# Patient Record
Sex: FEMALE | Race: WHITE | Employment: FULL TIME | URBAN - METROPOLITAN AREA
[De-identification: names, ages, dates, MRNs, and addresses within clinical notes are randomized per-mention and may not be internally consistent; named-entity substitution may affect disease eponyms.]

---

## 2019-10-10 ENCOUNTER — TELEPHONE (OUTPATIENT)
Dept: ADMINISTRATIVE | Age: 64
End: 2019-10-10

## 2021-07-12 ENCOUNTER — APPOINTMENT (OUTPATIENT)
Dept: GENERAL RADIOLOGY | Age: 66
End: 2021-07-12
Payer: COMMERCIAL

## 2021-07-12 ENCOUNTER — HOSPITAL ENCOUNTER (EMERGENCY)
Age: 66
Discharge: HOME | End: 2021-07-12
Attending: EMERGENCY MEDICINE
Payer: COMMERCIAL

## 2021-07-12 VITALS
OXYGEN SATURATION: 97 % | HEART RATE: 76 BPM | RESPIRATION RATE: 16 BRPM | WEIGHT: 148 LBS | HEIGHT: 63 IN | SYSTOLIC BLOOD PRESSURE: 127 MMHG | BODY MASS INDEX: 26.22 KG/M2 | DIASTOLIC BLOOD PRESSURE: 73 MMHG | TEMPERATURE: 97.7 F
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
## 2021-07-12 DIAGNOSIS — S82.831A CLOSED FRACTURE OF DISTAL END OF RIGHT FIBULA, UNSPECIFIED FRACTURE MORPHOLOGY, INITIAL ENCOUNTER: Primary | ICD-10-CM

## 2021-07-12 PROCEDURE — 99284 EMERGENCY DEPT VISIT MOD MDM: CPT

## 2021-07-12 PROCEDURE — 29515 APPLICATION SHORT LEG SPLINT: CPT

## 2021-07-12 PROCEDURE — 6370000000 HC RX 637 (ALT 250 FOR IP): Performed by: NURSE PRACTITIONER

## 2021-07-12 PROCEDURE — 73610 X-RAY EXAM OF ANKLE: CPT

## 2021-07-12 RX ORDER — HYDROCODONE BITARTRATE AND ACETAMINOPHEN 5; 325 MG/1; MG/1
1 TABLET ORAL ONCE
Status: COMPLETED | OUTPATIENT
Start: 2021-07-12 | End: 2021-07-12

## 2021-07-12 RX ORDER — NAPROXEN 500 MG/1
500 TABLET ORAL 2 TIMES DAILY PRN
Qty: 14 TABLET | Refills: 0 | Status: SHIPPED | OUTPATIENT
Start: 2021-07-12 | End: 2021-08-06

## 2021-07-12 RX ORDER — HYDROCODONE BITARTRATE AND ACETAMINOPHEN 5; 325 MG/1; MG/1
1 TABLET ORAL EVERY 6 HOURS PRN
Qty: 12 TABLET | Refills: 0 | Status: SHIPPED | OUTPATIENT
Start: 2021-07-12 | End: 2021-07-15

## 2021-07-12 RX ADMIN — HYDROCODONE BITARTRATE AND ACETAMINOPHEN 1 TABLET: 5; 325 TABLET ORAL at 11:49

## 2021-07-12 ASSESSMENT — PAIN DESCRIPTION - DESCRIPTORS: DESCRIPTORS: DISCOMFORT

## 2021-07-12 ASSESSMENT — PAIN - FUNCTIONAL ASSESSMENT: PAIN_FUNCTIONAL_ASSESSMENT: PREVENTS OR INTERFERES SOME ACTIVE ACTIVITIES AND ADLS

## 2021-07-12 ASSESSMENT — PAIN SCALES - GENERAL
PAINLEVEL_OUTOF10: 2
PAINLEVEL_OUTOF10: 6

## 2021-07-12 ASSESSMENT — PAIN DESCRIPTION - LOCATION: LOCATION: ANKLE

## 2021-07-12 ASSESSMENT — PAIN DESCRIPTION - ONSET: ONSET: ON-GOING

## 2021-07-12 ASSESSMENT — PAIN DESCRIPTION - FREQUENCY: FREQUENCY: INTERMITTENT

## 2021-07-12 ASSESSMENT — PAIN DESCRIPTION - ORIENTATION: ORIENTATION: RIGHT

## 2021-07-12 ASSESSMENT — PAIN DESCRIPTION - PAIN TYPE: TYPE: ACUTE PAIN

## 2021-07-12 NOTE — ED PROVIDER NOTES
ED Attending  CC: No         [address] [facility]  Department of Emergency Medicine   ED  Encounter Note  Admit Date/RoomTime: 2021 10:58 AM  ED Room:     NAME: Rebel Callejas  : 1955  MRN: [id number]     Chief Complaint:  Ankle Pain (rolled right ankle pain when stepped in box at work. )    History of Present Illness         Rebel Callejas is a 78 y.o. old female presenting to the emergency department by private vehicle, for traumatic Right ankle pain and swelling which occured 10:15 AM prior to arrival at [address] [place] [address] where she was working. The complaint is due to rolling her ankle after stepping on a cardboard box and tripping and caught herself before actually falling. She denies any other bodily injury and states she heard a \"crack\" in the ankle and felt pain immediately. Since onset the symptoms have been persistent and gradually worsening with ability to bear weight, but with some pain. Patient has no prior history of pain/injury with regards to today's visit. Her pain is aggraveated by certain movements or pressure on or palpation of painful area and relieved by nothing, as no treatment has been provided prior to this visit. She denies any neck pain, dizziness, numbness or tingling. Tetanus Status: up to date. ROS   Pertinent positives and negatives are stated within HPI, all other systems reviewed and are negative. Past Medical History:  has no past medical history on file. Surgical History:  has no past surgical history on file. Social History:  reports that she has been smoking. She has never used smokeless tobacco.    Family History: family history is not on file. Allergies: Patient has no known allergies.     Physical Exam   Oxygen Saturation Interpretation: Normal.        ED Triage Vitals   BP Temp Temp Source Pulse Resp SpO2 Height Weight   21 1111 21 1111 21 1111 21 1111 21 1111 21 1111 21 1114 21 1114   127/73 97.7 °F (36.5 °C) Infrared 76 16 97 % 5' 3\" (1.6 m) 148 lb (67.1 kg)         Constitutional:  Alert, development consistent with age. Neck:  Normal ROM. Supple. Right Ankle: Lateral malleolus              Tenderness:  moderate. Swelling: Moderate. Deformity: no deformity observed/palpated. ROM: diminished range with pain. Skin: . No abrasions and. Neurovascular: Motor deficit: none. Pain with side to side motion. Achilles tendon intact with flexion extension intact. Sensory deficit:   none. Sensation intact to light touch in distal toes. Pulse deficit: none. 2+ pedal and posterior tibial pulse intact. Capillary refill: normal.  Less than 3 seconds in distal toes. Right Knee:              Tenderness:  none. Swelling: None. Deformity: no deformity observed/palpated. ROM: full range of motion. Skin:  no wounds, erythema, or swelling. Right Foot:              Tenderness:  none. Swelling: None. Deformity: no deformity observed/palpated. ROM: full range of motion. Skin:  no wounds, erythema, or swelling  Gait:  limp due to affected limb. Lymphatics: No lymphangitis or adenopathy noted. Neurological:  Oriented. Motor functions intact. Lab / Imaging Results   (All laboratory and radiology results have been personally reviewed by myself)  Labs:  No results found for this visit on 07/12/21. Imaging: All Radiology results interpreted by Radiologist unless otherwise noted. XR ANKLE RIGHT (MIN 3 VIEWS)   Final Result   Comminuted fracture in the distal fibula. ED Course / Medical Decision Making     Medications   HYDROcodone-acetaminophen (NORCO) 5-325 MG per tablet 1 tablet (1 tablet Oral Given 7/12/21 1149)   Re evaluation: 5253 Dr. Jose Paez into examine patient. Consults:   None    Procedure(s):  PROCEDURE NOTE  7/12/21       Time:     SPLINT  APPLICATION  Risks, benefits and alternatives (for applicable procedures below) described. Performed By:  Katty Curtis and supervised by SYDNI Ortiz CNP. Indication:  fracture of right ankle . Procedure:   A sugar tong posterior leg splint was applied by the ECA   The patient tolerated the procedure well. Neurovascular status remains intact with no neurologic or sensory deficit on examination capillary refill remains less than 3 seconds in distal toes with full sensation intact. MDM:      Imaging was obtained based on moderate suspicion for fracture / bony abnormality as per history/physical findings. X-ray was positive for a distal fibular comminuted fracture. All compartments soft and compressible. Intact distal pulses. No neurologic or sensory deficit. She has no midline bony tenderness Nexus criteria was negative for imaging. She denies any other bodily injury. Patient was splinted and given crutches and advised on nonweightbearing until she is seen by orthopedic. She also was given corporate care for follow-up. Plan is subsequently for symptom control, limited use and  immobilization with appropriate outpatient follow-up with orthopedic on-call. Patient advised on precautions while taking narcotics not to drink alcohol, drive or operate heavy equipment and only take it for the stronger pain and can take the naproxen for lesser pain. Advised on signs and symptoms warranting immediate return to the ED for reevaluation at any time. Controlled Substance Monitoring:    Acute and Chronic Pain Monitoring:   RX Monitoring 7/12/2021   Periodic Controlled Substance Monitoring Possible medication side effects, risk of tolerance/dependence & alternative treatments discussed. ;No signs of potential drug abuse or diversion identified.          Plan of Care/Counseling:  SYDNI Ortiz CNP reviewed

## 2021-07-12 NOTE — ED NOTES
Pt discharged to waiting room to await drug screening as required by her employer     Yazmin Krishna, GLADIS  07/12/21 1367

## 2021-07-13 ENCOUNTER — TELEPHONE (OUTPATIENT)
Dept: ADMINISTRATIVE | Age: 66
End: 2021-07-13

## 2021-07-13 NOTE — TELEPHONE ENCOUNTER
Pt was seen in ED/[place] for Closed fracture of distal end of right fibula, unspecified fracture that happened at work. She can be reached at [phone] to schedule f/u w/Dr. Shonda Mack.

## 2021-07-13 NOTE — TELEPHONE ENCOUNTER
ED f/u from 7/12. XR R ankle:  Impression   Comminuted fracture in the distal fibula.             Pt placed in splint per ED note.

## 2021-07-16 ENCOUNTER — TELEPHONE (OUTPATIENT)
Dept: ORTHOPEDIC SURGERY | Age: 66
End: 2021-07-16

## 2021-07-16 DIAGNOSIS — M25.571 ACUTE RIGHT ANKLE PAIN: Primary | ICD-10-CM

## 2021-07-21 ENCOUNTER — OFFICE VISIT (OUTPATIENT)
Dept: ORTHOPEDIC SURGERY | Age: 66
End: 2021-07-21
Payer: COMMERCIAL

## 2021-07-21 ENCOUNTER — HOSPITAL ENCOUNTER (OUTPATIENT)
Dept: GENERAL RADIOLOGY | Age: 66
Discharge: HOME | End: 2021-07-23
Payer: COMMERCIAL

## 2021-07-21 VITALS — TEMPERATURE: 97.2 F

## 2021-07-21 DIAGNOSIS — S82.831A CLOSED FRACTURE OF DISTAL END OF RIGHT FIBULA, UNSPECIFIED FRACTURE MORPHOLOGY, INITIAL ENCOUNTER: ICD-10-CM

## 2021-07-21 DIAGNOSIS — M25.571 ACUTE RIGHT ANKLE PAIN: ICD-10-CM

## 2021-07-21 DIAGNOSIS — S82.831A CLOSED FRACTURE OF DISTAL END OF RIGHT FIBULA, UNSPECIFIED FRACTURE MORPHOLOGY, INITIAL ENCOUNTER: Primary | ICD-10-CM

## 2021-07-21 PROCEDURE — 99202 OFFICE O/P NEW SF 15 MIN: CPT | Performed by: ORTHOPAEDIC SURGERY

## 2021-07-21 PROCEDURE — 73610 X-RAY EXAM OF ANKLE: CPT

## 2021-07-21 PROCEDURE — 99214 OFFICE O/P EST MOD 30 MIN: CPT | Performed by: PHYSICIAN ASSISTANT

## 2021-07-21 PROCEDURE — 73590 X-RAY EXAM OF LOWER LEG: CPT

## 2021-07-21 PROCEDURE — L4350 ANKLE CONTROL ORTHO PRE OTS: HCPCS | Performed by: ORTHOPAEDIC SURGERY

## 2021-07-21 RX ORDER — HYDROCODONE BITARTRATE AND ACETAMINOPHEN 5; 325 MG/1; MG/1
1 TABLET ORAL EVERY 6 HOURS PRN
COMMUNITY

## 2021-07-21 RX ORDER — OMEPRAZOLE 10 MG/1
10 CAPSULE, DELAYED RELEASE ORAL DAILY
COMMUNITY

## 2021-07-21 RX ORDER — IBUPROFEN 800 MG/1
800 TABLET ORAL EVERY 6 HOURS PRN
COMMUNITY

## 2021-07-21 NOTE — PROGRESS NOTES
medical history or social history. No significant surgical history. She is taking Norco and anti-inflammatories for pain relief. She does still have some Norco at home. Review of Systems   Constitutional: Negative for fever, chills, diaphoresis, appetite change and fatigue. HENT: Negative for dental issues, hearing loss and tinnitus. Negative for congestion, sinus pressure, sneezing, sore throat. Negative for headache. Eyes: Negative for visual disturbance, blurred and double vision. Negative for pain, discharge, redness and itching  Respiratory: Negative for cough, shortness of breath and wheezing. Cardiovascular: Negative for chest pain, palpitations and leg swelling. No dyspnea on exertion   Gastrointestinal:   Negative for nausea, vomiting, abdominal pain, diarrhea, constipation  or black or bloody. Hematologic\Lymphatic:  negative for bleeding, petechiae,   Genitourinary: Negative for hematuria and difficulty urinating. Musculoskeletal: Negative for neck pain and stiffness. Mild for back pain, negative joint swelling and gait problem. Skin: Negative for pallor, rash and wound. Neurological: Negative for dizziness, tremors, seizures, weakness, light-headedness, no TIA or stroke symptoms. No numbness and headaches. Psychiatric/Behavioral: Negative.      Physical Examination:   General appearance: alert, well appearing, and in no distress,  normal appearing weight  Mental status: alert, oriented to person, place, and time, normal mood, behavior, speech, dress, motor activity, and thought processes  Abdomen: soft, nondistended   Resp:   resp easy and unlabored, no audible wheezes note  Cardiac: distal pulses palpable, skin well perfused  Neurological: alert, oriented X3, normal speech, no focal findings or movement disorder noted, motor and sensory grossly normal bilaterally, normal muscle tone, no tremors, strength 5/5, normal gait and station  HEENT: normochephalic atraumatic, external ears and eyes normal, sclera normal, neck supple  Extremities:   peripheral pulses normal, no edema, redness or tenderness in the calves   Skin: normal coloration, no rashes or open wounds, no suspicious skin lesions noted  Psych: Affect euthymic   Musculoskeletal:    Extremity:  Right Lower Extremity  Skin clean dry and intact, without signs of infection  Incisions well approximated without signs of redness, warmth or drainage- sutures intact  Mild edema noted globally about the right ankle with associated ecchymosis to the medial aspect of the foot  Mild tenderness palpation about the lateral malleolus, mild tenderness palpation about the lateral aspect of the mid right leg  Squeeze test positive  Compartments supple throughout thigh and leg  Calf supple and nontender  Demonstrates active knee flexion/extension, ankle plantar/dorsiflexion/great toe extension. States sensation intact to touch in sural/deep peroneal/superficial peroneal/saphenous/posterior tibial nerve distributions to foot/ankle. Palpable dorsalis pedis and posterior tibialis pulses, cap refill brisk in toes, foot warm/perfused. Temp 97.2 °F (36.2 °C)      XR: 7/21/21     Multiple views of R ankle and tib/fib demonstrating distal fibula fracture. No significant change in alignment from previous XR. Mortise still concentric without widening. No other acute fractures or dislocations or any other osseus abnormality identified. ASSESSMENT:     Diagnosis Orders   1. Closed fracture of distal end of right fibula, unspecified fracture morphology, initial encounter  XR TIBIA FIBULA RIGHT (2 VIEWS)       Discussion:  Had lengthy discussion with patient regarding Her diagnosis, typical prognosis, and expected outcomes. I reviewed the possible complications from the injury itself despite treatment choosen. I also discussed treatment options including nonoperative managements versus surgical management, along with risks and benefits of each. They have elected for conservative management at this time. Added on right tibia/fibula x-rays today due to positive squeeze test on exam however there is no appreciable fracture anywhere on x-ray besides the distal fibula fracture. No change to right ankle mortise, no widening. Will trial conservative treatment with nonweightbearing and immobilization. Patient verbalizes understanding and agrees to the plan. This is Worker's Comp. case and patient will remain off work. Patient is a nurse aide. We will continue multimodal pain medication with DME to assist with weightbearing restrictions. Discussed nicotine cessation. Discussed with patient factors that can impact patient's fracture healing. Patient has the following risk factors for union: Nicotine Use, advanced age     [facility] Patient Plan Of Care  New Orders Needing C-9 Authorization: DME CAM walking boot, shower bench through [employer]  [employer]/Work Status: Patient not released to return to work at this time and will be reevaluated at next office visit  Patient Progressing: Progressing as expected  Patient candidate for Vocational Rehab at this time: No  Patient determined to be MMI at this visit: No      PLAN:  · NWB R L   · Continue AD as needed for balance   · Shower bench ordered   · Transition to CAM boot today - on at all times except for hygiene   · Continue Norco, anti-inflammatories, tylenol, ice, elevation, compression with ace wrap. Call for medication refills. · Letter to remain off work given to patient today   · Stop all nicotine use. Dietary recs given in AVS   · Discussed with Dr. Servando Jacobs    Follow up in 3 weeks with repeat R ankle Xrays           Electronically signed by Gabriele Cardenas PA-C on 7/21/2021 at 11:03 AM  Note: This report was completed using [employer] voiced recognition software. Every effort has been made to ensure accuracy; however, inadvertent computerized transcription errors may be present.

## 2021-07-21 NOTE — PROGRESS NOTES
Abida Campuzano is a 78 y.o. female who presents for follow up of fracture right fibula   Workers Comp  SURGEON: Dr. Wesley Diaz MD  Date of Injury/Surgery: 7-12-21  Date last seen in office: First follow up    Symptoms: worse  New complaints:  Increase pain with removal of splint. Slight discomfort prior to removal.     Splint removed prior to X Ray  Weightbearing:  Non-weight bearing      Assistive device Rolling walker  Participating in therapy (location if yes)?  no    Refills Needed:  NORCO  And  Naproxen  Order/Referral Needed: N/A

## 2021-07-21 NOTE — PATIENT INSTRUCTIONS
ORTHOPEDIC TRAUMA TEAM STRONGLY RECOMMENDS THAT YOU STOP ALL NICOTINE USE  Nicotine severely impairs your body's ability to heal surgical and traumatic wounds but also impairs bone healing. Wounds and bone heal by forming microscopic blood vessels (angiogenesis) and nicotine is a vasoconstrictor (essentially, shrinks blood vessels). Therefore, if vasoconstriction occurs to these microscopic blood vessels they essentially disappear and are unable to deliver necessary nutrients to the healing tissue. Nicotine increases the chance of your bone not healing and possibly needing further surgery. Nicotine increases risk of infection. Nicotine causes poor wound healing and delayed wound healing. Quitting smoking or nicotine use is something that you can do to dramatically increase your chances of healing. Studies have shown patients who quit smoking have improved outcomes following orthopaedic surgery. This includes all forms of nicotine (smoking, vaping, patches, chewing tobacco, etc.)    After Fracture Nutrition Recommendations:  After a fracture, your bone needs to rebuild. A healthy, well-balanced diet rich in key nutrients can help speed that up. You don't need to take supplements unless your doctor recommends it. They don't always work well. It's much better to get the nutrition you need from your plate, not from a pill. Protein  About half your bone's structure is made of this. When you have a fracture, your body needs it to build new bone for the repair. It also helps your body take in and use calcium, another key nutrient for healthy bones. Good sources: Meat, fish, milk, cheese, cottage cheese, yogurt, nuts, seeds, beans, soy products, and fortified cereals. Calcium  This mineral also helps you build strong bones, so foods and drinks rich in it can help your bone fracture heal. Adults should get between 1,000 and 1,200 milligrams of calcium each day.  Your doctor will tell you if you need a calcium supplement, and what amount you should take if you do. Good sources: Milk, yogurt, cheese, cottage cheese, broccoli, turnip or clifford greens, kale, bok cassie, soy, beans, canned tuna or salmon with bones, almond milk, and fortified cereals or juice. Vitamin D  This vitamin should be a part of your diet to help your fracture heal. It helps your blood take in and use calcium and build up the minerals in your bones. You get some vitamin D when sunlight hits your skin, so it can be a good idea to spend a short amount of time outdoors each day -- 15 minutes may be enough for a fair-skinned person. Vitamin D is found naturally in only a few foods like egg yolks and fatty fish, but manufacturers add it to other foods, like milk or orange juice. Adults should get at least 600 IU of vitamin D every day, and if you're over 70 you should get at least 800 IU. Good sources: Swordfish, salmon, cod liver oil, sardines, liver, fortified milk or yogurt, egg yolks, and fortified orange juice. Vitamin C  Collagen is a protein that's an important building block for bone. Vitamin C helps your body make collagen, which helps your bone fracture heal. You can get it from many tasty, fresh fruits and veggies. Aged or heated produce can lose some of its vitamin C, so go for fresh or frozen. Good sources: Citrus fruits like oranges, kiwi fruit, berries, tomatoes, peppers, potatoes, and green vegetables. Iron  If you have iron-deficiency anemia -- when you don't have enough healthy red blood cells -- you may heal more slowly after a fracture. Iron helps your body make collagen to rebuild bone. It also plays a part in getting oxygen into your bones to help them heal.    Good sources: Red meat, dark-meat chicken or turkey, oily fish, eggs, dried fruits, leafy green veggies, whole-grain breads, and fortified cereals.     Potassium  Get enough of this mineral in your diet, and you won't lose as much calcium when you pee. There are lots of fresh fruits rich in potassium. Good sources: Bananas, orange juice, potatoes, nuts, seeds, fish, meat, and milk. What Not to Eat  It's a good idea to cut back on or skip these:    Alcohol: While you don't have to cut out alcoholic drinks, these beverages slow down bone healing. You won't build new bone as fast to fix the fracture. A bit too much alcohol can also make you unsteady on your feet, which can make you more likely to fall and risk an injury to the same bone. Salt: Too much of this in your diet can make you lose more calcium in your urine. Salt can be in some foods or drinks that don't taste salty, so check labels and aim for about 1 teaspoon, or 6 grams, a day. Coffee: Lots of caffeine -- more than four cups of strong coffee a day -- can slow down bone healing a little. It might make you pee more, and that could mean you lose more calcium through your urine. A moderate amount of coffee or tea should be fine.

## 2021-07-23 ENCOUNTER — TELEPHONE (OUTPATIENT)
Dept: ORTHOPEDIC SURGERY | Age: 66
End: 2021-07-23

## 2021-07-23 DIAGNOSIS — R93.89 ABNORMAL X-RAY: ICD-10-CM

## 2021-07-23 DIAGNOSIS — S82.831A CLOSED FRACTURE OF DISTAL END OF RIGHT FIBULA, UNSPECIFIED FRACTURE MORPHOLOGY, INITIAL ENCOUNTER: Primary | ICD-10-CM

## 2021-07-23 NOTE — TELEPHONE ENCOUNTER
Call placed to patient and explained that Eboni Osborne PA-C has ordered CT scan at the recommendation of radiologist from 7/21/2021 X-ray. Patient agreeable to CT Scan. Informed her that I will contact her once I received approval from [facility] so that she can schedule.     Future Appointments   Date Time Provider Tahir Christiansen   8/11/2021 10:45 AM Benjamín York MD [facility]

## 2021-07-23 NOTE — PROGRESS NOTES
CT placed R tib/fib.      Per radiologist read of R tib/fib on 7/21/21:    RECOMMENDATION:   Recommend consideration of knee CT or MRI to further evaluate the suspected   lytic lesion in the lateral tibial plateau.

## 2021-08-06 ENCOUNTER — HOSPITAL ENCOUNTER (OUTPATIENT)
Dept: CT IMAGING | Age: 66
Discharge: HOME | End: 2021-08-08
Payer: COMMERCIAL

## 2021-08-06 ENCOUNTER — APPOINTMENT (OUTPATIENT)
Dept: CT IMAGING | Age: 66
End: 2021-08-06
Payer: COMMERCIAL

## 2021-08-06 ENCOUNTER — HOSPITAL ENCOUNTER (EMERGENCY)
Age: 66
Discharge: HOME | End: 2021-08-06
Attending: STUDENT IN AN ORGANIZED HEALTH CARE EDUCATION/TRAINING PROGRAM
Payer: COMMERCIAL

## 2021-08-06 VITALS
WEIGHT: 146 LBS | SYSTOLIC BLOOD PRESSURE: 150 MMHG | OXYGEN SATURATION: 97 % | HEIGHT: 64 IN | TEMPERATURE: 98.2 F | BODY MASS INDEX: 24.92 KG/M2 | DIASTOLIC BLOOD PRESSURE: 82 MMHG | RESPIRATION RATE: 16 BRPM | HEART RATE: 82 BPM

## 2021-08-06 DIAGNOSIS — S82.831A CLOSED FRACTURE OF DISTAL END OF RIGHT FIBULA, UNSPECIFIED FRACTURE MORPHOLOGY, INITIAL ENCOUNTER: ICD-10-CM

## 2021-08-06 DIAGNOSIS — R93.89 ABNORMAL X-RAY: ICD-10-CM

## 2021-08-06 DIAGNOSIS — A59.9 TRICHIMONIASIS: Primary | ICD-10-CM

## 2021-08-06 LAB
ALBUMIN SERPL-MCNC: 4.3 G/DL (ref 3.5–5.2)
ALP BLD-CCNC: 94 U/L (ref 35–104)
ALT SERPL-CCNC: 21 U/L (ref 0–32)
ANION GAP SERPL CALCULATED.3IONS-SCNC: 13 MMOL/L (ref 7–16)
AST SERPL-CCNC: 19 U/L (ref 0–31)
BACTERIA: ABNORMAL /HPF
BASOPHILS ABSOLUTE: 0.04 E9/L (ref 0–0.2)
BASOPHILS RELATIVE PERCENT: 0.3 % (ref 0–2)
BILIRUB SERPL-MCNC: 0.3 MG/DL (ref 0–1.2)
BILIRUBIN URINE: NEGATIVE
BLOOD, URINE: NEGATIVE
BUN BLDV-MCNC: 16 MG/DL (ref 6–23)
CALCIUM SERPL-MCNC: 10.2 MG/DL (ref 8.6–10.2)
CHLORIDE BLD-SCNC: 103 MMOL/L (ref 98–107)
CLARITY: CLEAR
CO2: 25 MMOL/L (ref 22–29)
COLOR: YELLOW
CREAT SERPL-MCNC: 0.7 MG/DL (ref 0.5–1)
EOSINOPHILS ABSOLUTE: 0.08 E9/L (ref 0.05–0.5)
EOSINOPHILS RELATIVE PERCENT: 0.6 % (ref 0–6)
EPITHELIAL CELLS, UA: ABNORMAL /HPF
GFR AFRICAN AMERICAN: >60
GFR NON-AFRICAN AMERICAN: >60 ML/MIN/1.73
GLUCOSE BLD-MCNC: 112 MG/DL (ref 74–99)
GLUCOSE URINE: NEGATIVE MG/DL
HCT VFR BLD CALC: 44.2 % (ref 34–48)
HEMOGLOBIN: 15 G/DL (ref 11.5–15.5)
IMMATURE GRANULOCYTES #: 0.05 E9/L
IMMATURE GRANULOCYTES %: 0.4 % (ref 0–5)
KETONES, URINE: NEGATIVE MG/DL
LACTIC ACID: 3.3 MMOL/L (ref 0.5–2.2)
LEUKOCYTE ESTERASE, URINE: NEGATIVE
LIPASE: 21 U/L (ref 13–60)
LYMPHOCYTES ABSOLUTE: 2.16 E9/L (ref 1.5–4)
LYMPHOCYTES RELATIVE PERCENT: 15.6 % (ref 20–42)
MCH RBC QN AUTO: 30.5 PG (ref 26–35)
MCHC RBC AUTO-ENTMCNC: 33.9 % (ref 32–34.5)
MCV RBC AUTO: 90 FL (ref 80–99.9)
MONOCYTES ABSOLUTE: 0.56 E9/L (ref 0.1–0.95)
MONOCYTES RELATIVE PERCENT: 4 % (ref 2–12)
NEUTROPHILS ABSOLUTE: 10.94 E9/L (ref 1.8–7.3)
NEUTROPHILS RELATIVE PERCENT: 79.1 % (ref 43–80)
NITRITE, URINE: NEGATIVE
PDW BLD-RTO: 13.2 FL (ref 11.5–15)
PH UA: 6 (ref 5–9)
PLATELET # BLD: 270 E9/L (ref 130–450)
PMV BLD AUTO: 10.7 FL (ref 7–12)
POTASSIUM SERPL-SCNC: 4.5 MMOL/L (ref 3.5–5)
PROTEIN UA: NEGATIVE MG/DL
RBC # BLD: 4.91 E12/L (ref 3.5–5.5)
RBC UA: ABNORMAL /HPF (ref 0–2)
SODIUM BLD-SCNC: 141 MMOL/L (ref 132–146)
SPECIFIC GRAVITY UA: 1.02 (ref 1–1.03)
TOTAL PROTEIN: 7.4 G/DL (ref 6.4–8.3)
TRICHOMONAS: PRESENT /HPF
UROBILINOGEN, URINE: 0.2 E.U./DL
WBC # BLD: 13.8 E9/L (ref 4.5–11.5)
WBC UA: ABNORMAL /HPF (ref 0–5)

## 2021-08-06 PROCEDURE — 6370000000 HC RX 637 (ALT 250 FOR IP): Performed by: STUDENT IN AN ORGANIZED HEALTH CARE EDUCATION/TRAINING PROGRAM

## 2021-08-06 PROCEDURE — 83690 ASSAY OF LIPASE: CPT

## 2021-08-06 PROCEDURE — 99283 EMERGENCY DEPT VISIT LOW MDM: CPT

## 2021-08-06 PROCEDURE — 74177 CT ABD & PELVIS W/CONTRAST: CPT

## 2021-08-06 PROCEDURE — 81001 URINALYSIS AUTO W/SCOPE: CPT

## 2021-08-06 PROCEDURE — 36415 COLL VENOUS BLD VENIPUNCTURE: CPT

## 2021-08-06 PROCEDURE — 83605 ASSAY OF LACTIC ACID: CPT

## 2021-08-06 PROCEDURE — 85025 COMPLETE CBC W/AUTO DIFF WBC: CPT

## 2021-08-06 PROCEDURE — 73700 CT LOWER EXTREMITY W/O DYE: CPT

## 2021-08-06 PROCEDURE — 87088 URINE BACTERIA CULTURE: CPT

## 2021-08-06 PROCEDURE — 6360000004 HC RX CONTRAST MEDICATION: Performed by: RADIOLOGY

## 2021-08-06 PROCEDURE — 80053 COMPREHEN METABOLIC PANEL: CPT

## 2021-08-06 RX ORDER — METRONIDAZOLE 500 MG/1
2000 TABLET ORAL ONCE
Status: COMPLETED | OUTPATIENT
Start: 2021-08-06 | End: 2021-08-06

## 2021-08-06 RX ADMIN — METRONIDAZOLE 2000 MG: 500 TABLET ORAL at 16:10

## 2021-08-06 RX ADMIN — IOPAMIDOL 75 ML: 755 INJECTION, SOLUTION INTRAVENOUS at 15:22

## 2021-08-06 ASSESSMENT — PAIN DESCRIPTION - LOCATION: LOCATION: FLANK

## 2021-08-06 ASSESSMENT — PAIN SCALES - GENERAL: PAINLEVEL_OUTOF10: 6

## 2021-08-06 ASSESSMENT — PAIN DESCRIPTION - PAIN TYPE: TYPE: ACUTE PAIN

## 2021-08-06 ASSESSMENT — PAIN DESCRIPTION - ONSET: ONSET: SUDDEN

## 2021-08-06 ASSESSMENT — PAIN DESCRIPTION - FREQUENCY: FREQUENCY: CONTINUOUS

## 2021-08-06 ASSESSMENT — PAIN DESCRIPTION - PROGRESSION: CLINICAL_PROGRESSION: GRADUALLY WORSENING

## 2021-08-06 ASSESSMENT — PAIN DESCRIPTION - ORIENTATION: ORIENTATION: RIGHT

## 2021-08-06 ASSESSMENT — PAIN DESCRIPTION - DESCRIPTORS: DESCRIPTORS: ACHING

## 2021-08-06 NOTE — ED PROVIDER NOTES
ED  Provider Note  Admit Date/RoomTime: 8/6/2021 12:49 PM  ED Room: 07/07      History of Present Illness:  8/6/21, Time: 1:48 PM EDT  Chief Complaint   Patient presents with    Flank Pain     with vomiting. Started this am.          Court Reece is a 78 y.o. female presenting to the ED for flank pain and nausea with multiple episodes of nonbloody nonbilious emesis. Started this morning, sudden onset. No ingestion of raw or new foods, no history of kidney stones, denies dysuria hematuria but does have a bit of increased urinary frequency. Pain is nonpleuritic, no falls or trauma, no fevers or chills, no diarrhea or constipation. No recent illnesses or antibiosis. She reports 5 or 6 episodes of emesis prior to arrival in only a few short hours since this morning's symptom onset. Review of Systems:     A complete review of systems was performed and pertinent positives and negatives are stated within HPI, all other systems reviewed and are negative.        --------------------------------------------- PATIENT HISTORY--------------------------------------------  Past Medical History:  has no past medical history on file. Past Surgical History:  has a past surgical history that includes Ankle fracture surgery. Family History: family history is not on file. . Unless otherwise noted, family history is non contributory    Social History:  reports that she has been smoking. She has never used smokeless tobacco.    The patients home medications have been reviewed. Allergies: Patient has no known allergies.     I have reviewed the past medical history, past surgical history, social history, and family history    ---------------------------------------------------PHYSICAL EXAM--------------------------------------    Constitutional/General: Alert and oriented x3  Head: Normocephalic and atraumatic  Eyes: PERRL, EOMI, sclera non icteric  ENT: Oropharynx clear, handling secretions, no trismus  Neck: Supple, full ROM, no stridor, no meningismus  Respiratory: Lungs clear to auscultation bilaterally, no wheezes, rales, or rhonchi  Cardiovascular: RRR, no R/G/M, 2+ peripheral pulses  Chest: No chest wall tenderness, equal chest rise  Gastrointestinal:  Abdomen Soft, mildly tender diffusely, Non distended. No rebound, guarding, or rigidity. No pulsatile masses. Musculoskeletal: Extremities warm and well perfused, moving all extremities  Skin: skin warm and dry. No rashes. Neurologic: No focal deficits, strength and sensation grossly intact   Psychiatric: Normal Affect, behavior normal          -------------------------------------------------- RESULTS -------------------------------------------------  I have personally reviewed all laboratory and imaging results for this patient. Results are listed below.      LABS: (Lab results interpreted by me)  Results for orders placed or performed during the hospital encounter of 08/06/21   Culture, Urine    Specimen: Urine, clean catch   Result Value Ref Range    Urine Culture, Routine Growth not present, incubation continues    Urinalysis with Microscopic   Result Value Ref Range    Color, UA Yellow Straw/Yellow    Clarity, UA Clear Clear    Glucose, Ur Negative Negative mg/dL    Bilirubin Urine Negative Negative    Ketones, Urine Negative Negative mg/dL    Specific Gravity, UA 1.025 1.005 - 1.030    Blood, Urine Negative Negative    pH, UA 6.0 5.0 - 9.0    Protein, UA Negative Negative mg/dL    Urobilinogen, Urine 0.2 <2.0 E.U./dL    Nitrite, Urine Negative Negative    Leukocyte Esterase, Urine Negative Negative    WBC, UA 2-5 0 - 5 /HPF    RBC, UA 0-1 0 - 2 /HPF    Epithelial Cells, UA MODERATE /HPF    Bacteria, UA RARE (A) None Seen /HPF    Trichomonas, UA Present (A) None Seen /HPF   CBC auto differential   Result Value Ref Range    WBC 13.8 (H) 4.5 - 11.5 E9/L    RBC 4.91 3.50 - 5.50 E12/L    Hemoglobin 15.0 11.5 - 15.5 g/dL    Hematocrit 44.2 34.0 - 48.0 %    MCV 90.0 80.0 - 99.9 fL    MCH 30.5 26.0 - 35.0 pg    MCHC 33.9 32.0 - 34.5 %    RDW 13.2 11.5 - 15.0 fL    Platelets 271 543 - 551 E9/L    MPV 10.7 7.0 - 12.0 fL    Neutrophils % 79.1 43.0 - 80.0 %    Immature Granulocytes % 0.4 0.0 - 5.0 %    Lymphocytes % 15.6 (L) 20.0 - 42.0 %    Monocytes % 4.0 2.0 - 12.0 %    Eosinophils % 0.6 0.0 - 6.0 %    Basophils % 0.3 0.0 - 2.0 %    Neutrophils Absolute 10.94 (H) 1.80 - 7.30 E9/L    Immature Granulocytes # 0.05 E9/L    Lymphocytes Absolute 2.16 1.50 - 4.00 E9/L    Monocytes Absolute 0.56 0.10 - 0.95 E9/L    Eosinophils Absolute 0.08 0.05 - 0.50 E9/L    Basophils Absolute 0.04 0.00 - 0.20 E9/L   Comprehensive Metabolic Panel   Result Value Ref Range    Sodium 141 132 - 146 mmol/L    Potassium 4.5 3.5 - 5.0 mmol/L    Chloride 103 98 - 107 mmol/L    CO2 25 22 - 29 mmol/L    Anion Gap 13 7 - 16 mmol/L    Glucose 112 (H) 74 - 99 mg/dL    BUN 16 6 - 23 mg/dL    CREATININE 0.7 0.5 - 1.0 mg/dL    GFR Non-African American >60 >=60 mL/min/1.73    GFR African American >60     Calcium 10.2 8.6 - 10.2 mg/dL    Total Protein 7.4 6.4 - 8.3 g/dL    Albumin 4.3 3.5 - 5.2 g/dL    Total Bilirubin 0.3 0.0 - 1.2 mg/dL    Alkaline Phosphatase 94 35 - 104 U/L    ALT 21 0 - 32 U/L    AST 19 0 - 31 U/L   Lactic Acid, Plasma   Result Value Ref Range    Lactic Acid 3.3 (H) 0.5 - 2.2 mmol/L   Lipase   Result Value Ref Range    Lipase 21 13 - 60 U/L         RADIOLOGY:  Imaging interpreted by Radiologist unless otherwise specified  CT ABDOMEN PELVIS W IV CONTRAST Additional Contrast? None   Final Result   1. No acute abnormality is seen in the abdomen or the pelvis. 2. 6 mm left lower lobe pulmonary nodule. See recommendations below. RECOMMENDATIONS:   6.0 mm solid pulmonary nodule detected on incomplete chest CT. Recommend a non-contrast Chest CT at 6-12 months, then consider an additional   non-contrast Chest CT at 18-24 months.    These guidelines do not apply to immunocompromised patients and patients with   cancer. Follow up in patients with significant comorbidities as clinically   warranted. For lung cancer screening, adhere to Lung-RADS guidelines. Reference: Radiology. 2017; 284(1):228-43.               ------------------------- NURSING NOTES AND VITALS REVIEWED ---------------------------  The nursing notes within the ED encounter and vital signs as below have been reviewed by myself  BP (!) 150/82   Pulse 82   Temp 98.2 °F (36.8 °C) (Infrared)   Resp 16   Ht 5' 3.5\" (1.613 m)   Wt 146 lb (66.2 kg)   SpO2 97%   BMI 25.46 kg/m²      The patients available past medical records and past encounters were reviewed. ------------------------------ ED COURSE/MEDICAL DECISION MAKING----------------------  Medications   iopamidol (ISOVUE-370) 76 % injection 75 mL (75 mLs Intravenous Given 8/6/21 1522)   metroNIDAZOLE (FLAGYL) tablet 2,000 mg (2,000 mg Oral Given 8/6/21 1610)         I, Dr. Riley Edmondson, am the primary provider of record    Medical Decision Making:   14-year-old female presenting with     Oxygen Saturation Interpretation:       Re-Evaluations:     Patient remains well-appearing. Discussed trichomonas results, will give metronidazole x1 dose 2 g here in the emergency department. Patient does have a slight leukocytosis and lactate elevation, suspect that this is reactive secondary to her multiple episodes of emesis. Otherwise her electrolytes are unremarkable and her labs and CT are reassuring. Remains hemodynamically stable, will be discharged home. This patient's ED course included: a personal history and physicial examination    This patient has remained hemodynamically stable during their ED course. Consultations:  none    ED Counseling: This emergency provider has spoken with the patient and any family present to discuss clinical status, results, plan of care, diagnosis and prognosis as able to be determined at this time.  Any questions were answered and patient and/or family/POA are agreeable with the plan.       --------------------------------- IMPRESSION AND DISPOSITION ---------------------------------    IMPRESSION  1. Trichimoniasis        DISPOSITION  Disposition: Discharge to home  Patient condition is good      This report was transcribed using voice recognition software. Every effort was made to ensure accuracy; however, transcription errors may be present.          Jennifer Maharaj MD  08/08/21 [id number]

## 2021-08-08 LAB — URINE CULTURE, ROUTINE: NORMAL

## 2021-08-09 ENCOUNTER — TELEPHONE (OUTPATIENT)
Dept: ORTHOPEDIC SURGERY | Age: 66
End: 2021-08-09

## 2021-08-09 NOTE — TELEPHONE ENCOUNTER
Call placed to patient and explained to her the findings of CT scan. Patient was grateful and expressed understanding. Verbally confirmed appointment for 8/11/2021. Future Appointments   Date Time Provider Tahir Christiansen   8/11/2021 10:45 AM Sofi Thomas MD [facility]         ----- Message from Avery Jimenez PA-C sent at 8/9/2021  2:27 PM EDT -----  Regarding: [facility] CT  Can you call this patient back? The CT we ordered through [facility] showed that her ankle fractures have not displaced, her joints appear fine, and the lesion to the proximal tibia appears benign. Keep next follow up appt. We don't need an updated XR when she comes back to the office and can use the recent CT instead.      Future Appointments  8/11/2021  10:45 AM   Sofi Thomas MD [facility]            [facility]

## 2021-08-11 ENCOUNTER — OFFICE VISIT (OUTPATIENT)
Dept: ORTHOPEDIC SURGERY | Age: 66
End: 2021-08-11
Payer: COMMERCIAL

## 2021-08-11 DIAGNOSIS — S82.831A CLOSED FRACTURE OF DISTAL END OF RIGHT FIBULA, UNSPECIFIED FRACTURE MORPHOLOGY, INITIAL ENCOUNTER: Primary | ICD-10-CM

## 2021-08-11 PROCEDURE — 99215 OFFICE O/P EST HI 40 MIN: CPT | Performed by: PHYSICIAN ASSISTANT

## 2021-08-11 PROCEDURE — 99212 OFFICE O/P EST SF 10 MIN: CPT | Performed by: ORTHOPAEDIC SURGERY

## 2021-08-11 RX ORDER — NAPROXEN 500 MG/1
500 TABLET ORAL 2 TIMES DAILY PRN
Qty: 60 TABLET | Refills: 0 | Status: SHIPPED | OUTPATIENT
Start: 2021-08-11

## 2021-08-11 RX ORDER — HYDROCODONE BITARTRATE AND ACETAMINOPHEN 5; 325 MG/1; MG/1
1 TABLET ORAL DAILY PRN
Qty: 7 TABLET | Refills: 0 | Status: SHIPPED | OUTPATIENT
Start: 2021-08-11 | End: 2021-08-18

## 2021-08-11 NOTE — PROGRESS NOTES
1.8 cm circumscribed benign-appearing subchondral cysts of the lateral tibial plateau. She has been nonweightbearing in the boot. She states the pain and swelling has gone down in her ankle. Denies any pain in the heel or foot. Denies pain in the knee or shin area. Review of Systems   Constitutional: Negative for fever, chills, diaphoresis, appetite change and fatigue. HENT: Negative for dental issues, hearing loss and tinnitus. Negative for congestion, sinus pressure, sneezing, sore throat. Negative for headache. Eyes: Negative for visual disturbance, blurred and double vision. Negative for pain, discharge, redness and itching  Respiratory: Negative for cough, shortness of breath and wheezing. Cardiovascular: Negative for chest pain, palpitations and leg swelling. No dyspnea on exertion   Gastrointestinal:   Negative for nausea, vomiting, abdominal pain, diarrhea, constipation  or black or bloody. Hematologic\Lymphatic:  negative for bleeding, petechiae,   Genitourinary: Negative for hematuria and difficulty urinating. Musculoskeletal: Negative for neck pain and stiffness. Negative for back pain, see HPI  Skin: Negative for pallor, rash and wound. Neurological: Negative for dizziness, tremors, seizures, weakness, light-headedness, no TIA or stroke symptoms. No numbness and headaches. Psychiatric/Behavioral: Negative. OBJECTIVE:      Physical Examination:   General appearance: alert, well appearing, and in no distress,  normal appearing weight.  No visible signs of trauma   Mental status: alert, oriented to person, place, and time, normal mood, behavior, speech, dress, motor activity, and thought processes  Abdomen: soft, nondistended  Resp:   resp easy and unlabored, no audible wheezes note, normal symmetrical expansion of both hemithoraces  Cardiac: distal pulses palpable, skin and extremities well perfused  Neurological: alert, oriented X3, normal speech, no focal findings or movement disorder noted, motor and sensory grossly normal bilaterally, normal muscle tone, no tremors, strength 5/5, normal gait and station  HEENT: normochephalic atraumatic, external ears and eyes normal, sclera normal, neck supple, no nasal discharge. Extremities:   peripheral pulses normal, no edema, redness or tenderness in the calves   Skin: normal coloration, no rashes or open wounds, no suspicious skin lesions noted  Psych: Affect euthymic   Musculoskeletal:   Extremity:  Right Lower Extremity  Skin clean dry and intact, without signs of infection  Mild edema noted to the lateral right ankle. No ecchymosis or tenderness to palpation over the lateral malleolus. Compartments supple throughout thigh and leg  Calf supple and nontender  Demonstrates active knee flexion/extension, ankle plantar/dorsiflexion/great toe extension. States sensation intact to touch in sural/deep peroneal/superficial peroneal/saphenous/posterior tibial nerve distributions to foot/ankle. Palpable dorsalis pedis and posterior tibialis pulses, cap refill brisk in toes, foot warm/perfused. There were no vitals taken for this visit. 3 views right ankle from 7-21-21 demonstrate stable minimally displaced fracture distal fibula/lateral malleolus without significant change compared to prior films. Right tib-fib CT from 8-6-21:  1.  Recent fracture of the posterior and lateral malleoli, right ankle.  The   ankle mortise is not widened.       2.  No proximal leg fracture.       3.  1.8 cm circumscribed benign appearing subchondral cyst of the lateral   tibial plateau. ASSESSMENT:   Diagnosis Orders   1. Closed fracture of distal end of right fibula, unspecified fracture morphology, initial encounter       Discussion: Had lengthy discussion with patient regarding Her diagnosis, typical prognosis, and expected outcomes. I reviewed the possible complications from the injury itself despite treatment choosen.  I also discussed treatment options including nonoperative managements versus surgical management, along with risks and benefits of each. They have elected for nonoperative management at this time. PLAN:  X-rays and CT reviewed today. Okay to start partial weightbearing right lower extremity in the boot for 2 weeks then if tolerating this well, no increased pain okay to progress to weightbearing right lower extremity as tolerated in the boot for 1 week then transition to a regular shoe if not having pain. We will set you up for a physical therapy at [facility]. [place] and naproxen will be called to your pharmacy. Okay to remove boot when nonweightbearing at this point. Follow-up in 3 weeks for further evaluation, x-rays and discussion regarding return to work status. Call if any questions or concerns. [facility] Patient Plan Of Care  New Orders Needing C-9 Authorization: Physical Therapy 2 to 3 days a week for 6 to 8 weeks  [employer]/Work Status: Okay for sedentary desk duties at this point. Patient Progressing: Progressing as expected  Patient candidate for Vocational Rehab at this time: No  Patient determined to be MMI at this visit: No    Electronically signed by CHI Wills on 8/11/2021 at 11:50 AM  Note: This report was completed using [employer] voiced recognition software. Every effort has been made to ensure accuracy; however, inadvertent computerized transcription errors may be present.

## 2021-08-11 NOTE — PROGRESS NOTES
Worker's Comp patient, Suzette Barrow. [facility] [phone] for review of CT scan of Right Tib Fib from 2021. Patient presents NWB with CAM boot on right LE utilizing a wheeled walker. Denies new injury or fall since LOV 2021. States achy discomfort comes and goes. Utilizing the Naproxen and Norco at night. Used last of them last night. States she did not  the [employer] after last visit. States the meds help.     Electronically signed by Jaquelin Ram LPN on  at 98:11 AM

## 2021-08-11 NOTE — PATIENT INSTRUCTIONS
Okay to start partial weightbearing right lower extremity in the boot for 2 weeks then if tolerating this well, no increased pain okay to progress to weightbearing right lower extremity as tolerated in the boot for 1 week then transition to a regular shoe if not having pain. We will set you up for a physical therapy at [facility]. [place] and naproxen will be called to your pharmacy. Okay to remove boot when nonweightbearing at this point. Follow-up in 3 weeks for further evaluation, x-rays and discussion regarding return to work status. Call if any questions or concerns.

## 2021-08-12 ENCOUNTER — TELEPHONE (OUTPATIENT)
Dept: ORTHOPEDIC SURGERY | Age: 66
End: 2021-08-12

## 2021-08-12 DIAGNOSIS — S82.831A CLOSED FRACTURE OF DISTAL END OF RIGHT FIBULA, UNSPECIFIED FRACTURE MORPHOLOGY, INITIAL ENCOUNTER: Primary | ICD-10-CM

## 2021-08-12 NOTE — TELEPHONE ENCOUNTER
Spoke to patient regarding her return to work restrictions for the [employer]. Patient's work willing to allow sedentary, desk duty only on-site and also will provide transportation to and from work, as she is in a CAM boot on the R LE. Patient agreeable to this plan as long as her work follows all restrictions given by our office. While on the phone with patient, she requested a change in location for her outpatient therapy, from [facility] to [facility] in [facility]. New Order pended and routed to providers for decision and signature.     Future Appointments   Date Time Provider Tahir Christiansen   9/1/2021 10:30 AM Nadiya Erickson MD [facility]

## 2021-08-27 DIAGNOSIS — S82.831A CLOSED FRACTURE OF DISTAL END OF RIGHT FIBULA, UNSPECIFIED FRACTURE MORPHOLOGY, INITIAL ENCOUNTER: Primary | ICD-10-CM

## 2021-09-01 ENCOUNTER — HOSPITAL ENCOUNTER (OUTPATIENT)
Dept: GENERAL RADIOLOGY | Age: 66
Discharge: HOME | End: 2021-09-03
Payer: COMMERCIAL

## 2021-09-01 ENCOUNTER — OFFICE VISIT (OUTPATIENT)
Dept: ORTHOPEDIC SURGERY | Age: 66
End: 2021-09-01
Payer: COMMERCIAL

## 2021-09-01 DIAGNOSIS — S82.831A CLOSED FRACTURE OF DISTAL END OF RIGHT FIBULA, UNSPECIFIED FRACTURE MORPHOLOGY, INITIAL ENCOUNTER: ICD-10-CM

## 2021-09-01 DIAGNOSIS — S82.831D CLOSED FRACTURE OF DISTAL END OF RIGHT FIBULA WITH ROUTINE HEALING, UNSPECIFIED FRACTURE MORPHOLOGY, SUBSEQUENT ENCOUNTER: Primary | ICD-10-CM

## 2021-09-01 PROCEDURE — 99212 OFFICE O/P EST SF 10 MIN: CPT

## 2021-09-01 PROCEDURE — 99213 OFFICE O/P EST LOW 20 MIN: CPT | Performed by: PHYSICIAN ASSISTANT

## 2021-09-01 PROCEDURE — 73590 X-RAY EXAM OF LOWER LEG: CPT

## 2021-09-01 NOTE — PROGRESS NOTES
Chief Complaint   Patient presents with    Follow-up     Ankle has been doing good. Pt started walking on it 8/30/2021. Pt expressed having slight discomfort. Pt expressed been sore in the Right Ankle. Pt expressed having tingling in Right ankle along the lateral aspect of Right Ankle. Pt expressed probably walking too much on Monday 8/30/2021       Subjective:  Vira Walker is approximately 6 wks follow-up from the above DOI. Patient is WBAT on that extremity with CAM walking boot. This is C claim and has been back to work, but thinks she over exerted herself recently and had moderate R ankle soreness. Says she regularly walks at least 25,000 steps per day. Does not think she is ready to wean out of CAM boot yet. She ambulates with no assistive device, none. Pain to extremity is mild only when over exerting herself, but is tolerable. They denies paresthesias. Patient is  participating in therapy, outpatient at [facility]. Denies calf pain, CP, SOB, fever, chills, malaise. Review of Systems -  all pertinent positives and negatives in HPI. Objective:    General: Alert and oriented X 3, normocephalic atraumatic, external ears and eye normal, sclera clear, no acute distress, respirations easy and unlabored with no audible wheezes, skin warm and dry, speech and dress appropriate for noted age, affect euthymic. Extremity:  Left Lower Extremity  Skin clean dry and intact, without signs of infection  No edema noted  Very mild TTP about lateral malleoli, nontender elsewhere about the foot and ankle   Compartments supple throughout thigh and leg  Calf supple and nontender  Demonstrates active knee flexion/extension, ankle plantar/dorsiflexion/great toe extension. Active plantarflexion 40, dorsiflexion 15   States sensation intact to touch in sural/deep peroneal/superficial peroneal/saphenous/posterior tibial nerve distributions to foot/ankle.    Palpable dorsalis pedis and posterior tibialis pulses, cap refill brisk in toes, foot warm/perfused. There were no vitals taken for this visit. XR:   2 views of R tib/fib demonstrating interval healing of lateral and posterior malleoli. Mortise remains concentric without widening. No significant change in alignment. No acute fractures or dislocations or any other osseus abnormality identified. Assessment:   Diagnosis Orders   1. Closed fracture of distal end of right fibula with routine healing, unspecified fracture morphology, subsequent encounter  XR ANKLE RIGHT (MIN 3 VIEWS)       Plan:   Reviewed x-rays with patient today in office    Continue WBAT L LE with CAM walking boot - can transition from boot to supportive shoe gear with help of PT when tolerable    Letter for continued light duty given to patient today    Continue PT and HEP   Continue otc analgesics PRN      [facility] Patient Plan Of Care  New Orders Needing C-9 Authorization: None at this appointment  [employer]/Work Status: Released to light duty if available with restrictions of ambulation 15-20 mins out of every hour as tolerated with walking boot on for comfort   Patient Progressing: Progressing as expected  Patient candidate for Vocational Rehab at this time: No  Patient determined to be [address] at this visit: No      Follow up in 6 weeks with XR of the R ankle     Electronically signed by Juvencio Dias PA-C on 9/1/2021 at 11:13 AM  Note: This report was completed using [employer] voiced recognition software. Every effort has been made to ensure accuracy; however, inadvertent computerized transcription errors may be present.

## 2021-09-01 NOTE — LETTER
[address] [facility] [address]  [address] [facility] [address]  Phone: [phone]  Fax: [phone]    Conrado Sarabia PA-C        September 1, 2021     Patient: [id number]as   YOB: 1955   Date of Visit: 9/1/2021       To Whom It May Concern: It is my medical opinion that [facility] may return to light duty work with the following restrictions: Patient may only do15-20 minutes of ambulation each hour as tolerates. She must be allowed to keep boot on at all times and continue all other light duty restrictions until re-evaluated in 6 weeks. If you have any questions or concerns, please don't hesitate to call.     Sincerely,          Conrado Sarabia PA-C

## 2021-09-01 NOTE — PROGRESS NOTES
Sandy Veloz is a 78 y.o. female who presents for follow up of R Ankle, Right Distal Fib F/U    SURGEON: Dr. Constantin Orantes MD  Date of Injury/Surgery: 7/12/2021  Date last seen in office: 8/11/2021    Symptoms: unchanged  New complaints: Pt expressed that due to walking and physical therapy pain has increased. Pt admits to overdoing it when it comes to ambulation. Cast/Splint, Brace, or Dressings: Well fitting. Boot and Compression Wrap    Weightbearing: right lower Toe touch weight bearing and Partial weight bearing. Patient admits to removing walking to boot to walk at home from time to time. Assistive device Walking Boot. Participating in therapy (location if yes)?  yes, [facility]      Refills Needed: None  Order/Referral Needed: no

## 2021-10-13 ENCOUNTER — HOSPITAL ENCOUNTER (OUTPATIENT)
Dept: GENERAL RADIOLOGY | Age: 66
Discharge: HOME | End: 2021-10-15
Payer: COMMERCIAL

## 2021-10-13 ENCOUNTER — OFFICE VISIT (OUTPATIENT)
Dept: ORTHOPEDIC SURGERY | Age: 66
End: 2021-10-13
Payer: COMMERCIAL

## 2021-10-13 VITALS — TEMPERATURE: 97.8 F

## 2021-10-13 DIAGNOSIS — S82.821D CLOSED TORUS FRACTURE OF DISTAL END OF RIGHT FIBULA WITH ROUTINE HEALING, SUBSEQUENT ENCOUNTER: Primary | ICD-10-CM

## 2021-10-13 DIAGNOSIS — S82.831D CLOSED FRACTURE OF DISTAL END OF RIGHT FIBULA WITH ROUTINE HEALING, UNSPECIFIED FRACTURE MORPHOLOGY, SUBSEQUENT ENCOUNTER: ICD-10-CM

## 2021-10-13 PROCEDURE — 99213 OFFICE O/P EST LOW 20 MIN: CPT | Performed by: ORTHOPAEDIC SURGERY

## 2021-10-13 PROCEDURE — 99212 OFFICE O/P EST SF 10 MIN: CPT | Performed by: ORTHOPAEDIC SURGERY

## 2021-10-13 PROCEDURE — 73610 X-RAY EXAM OF ANKLE: CPT

## 2021-10-13 NOTE — PROGRESS NOTES
Sourav Gomez is a 78 y.o. female who presents for follow up of right ankle    SURGEON: Dr. Mini Augustin MD  Date of Injury/Surgery: 7/12/2021  Date last seen in office: 9/1/2021    Symptoms: better  New complaints: patient states that she is doing much better. She has weaned out of the boot and is wearing an ace bandage with her tennis shoe. Weightbearing: right lower Weight bearing as tolerated      Assistive device No Device  Participating in therapy (location if yes)?  no    Refills Needed: None  Order/Referral Needed: N/A

## 2021-10-13 NOTE — PROGRESS NOTES
Chief Complaint   Patient presents with    Ankle Pain     Right distal fib fx 7/12/2021; [facility] injury       SUBJECTIVE: Patient is a very pleasant 79-year-old female who suffered a right distal fibular fracture on 7/12/2021. She did suffer this injury at work. Patient has been doing very well. She has weaned out of her boot. She does still wear at work. She has returned to work. She does have some restrictions of not lifting significant months weight etc.  She denies any further injury. She has very minimal pain. Review of Systems -   General ROS: negative for - chills, fatigue, fever or night sweats  Respiratory ROS: no cough, shortness of breath, or wheezing  Cardiovascular ROS: no chest pain or dyspnea on exertion  Gastrointestinal ROS: no abdominal pain, change in bowel habits, or black or bloody stools  Genitourinary: no hematuria, dysuria, or incontinence   Musculoskeletal ROS:see above  Neurological ROS: no TIA or stroke symptoms       OBJECTIVE:   Alert and oriented X 3, no acute distress, respirations easy and unlabored with no audible wheezes, skin warm and dry, speech and dress appropriate for noted age, affect euthymic. Extremity:  Right lower extremity   Skin intact   No significant ecchymosis   Mild swelling laterally   No tenderness palpation of the fracture site and no medial tenderness to palpation   Ankle range of motion is 20 degrees of dorsiflexion and 35 degrees of plantarflexion   Subluxation of the peroneal tendons   Compartments soft and compressible   Calves soft and non tender    5/5 EHL, TA, GS   2/4 DP and PT pulses   Sensation intact distally   Capillary refill less than 3 seconds       XR: 10/13/21 x-ray right ankle shows a well aligned mortise. Her fibula fracture is nearly fully healed with good callus formation and no further displacement. Temp 97.8 °F (36.6 °C) (Oral)     ASSESSMENT:     Diagnosis Orders   1.  Closed torus fracture of distal end of right fibula with routine healing, subsequent encounter         PLAN:  Weight-bear as tolerated right lower extremity    Work restrictions until November 29, 2021    Follow-up here in 3 months    Call sooner with any questions or concerns      ELECTRONICALLY signed by:    Faith Guerrero MD  10/13/21    [id number] Patient Plan Of Care  New Orders Needing C-9 Authorization: None at this appointment  [employer]/Work Status: Released to light duty if available with restrictions of No lifting pushing or pulling more than 20 pounds. Released to full duty with no restrictions on November 29, 2021.   Patient Progressing: Progressing as expected  Patient candidate for Vocational Rehab at this time: No  Patient determined to be MMI at this visit: No

## 2021-10-13 NOTE — PATIENT INSTRUCTIONS
Continue restrictions until November 20, 2021.     Follow-up here in 3 months    Call sooner with any questions, concerns, increased pain etc.

## 2021-10-13 NOTE — LETTER
[address]  [address]  [address] [facility] [address]  Phone: [phone]  Fax: [phone] Joshua Grace MD        October 13, 2021     Patient: [address] HerreraKwasi Navas   YOB: 1955   Date of Visit: 10/13/2021       To Whom It May Concern: It is my medical opinion that [facility] Should continue restrictions of no lifting, pushing, pulling more than 20 pounds. May start working with no restrictions on November 29, 2021. .    If you have any questions or concerns, please don't hesitate to call.     Sincerely,        Ricardo Matthews MD

## 2022-01-11 ENCOUNTER — TELEPHONE (OUTPATIENT)
Dept: ORTHOPEDIC SURGERY | Age: 67
End: 2022-01-11

## 2022-01-11 DIAGNOSIS — S82.821D CLOSED TORUS FRACTURE OF DISTAL END OF RIGHT FIBULA WITH ROUTINE HEALING, SUBSEQUENT ENCOUNTER: Primary | ICD-10-CM

## 2022-02-25 ENCOUNTER — TELEPHONE (OUTPATIENT)
Dept: ORTHOPEDIC SURGERY | Age: 67
End: 2022-02-25

## 2022-02-25 NOTE — TELEPHONE ENCOUNTER
Call placed to patient at this time. Appointment scheduled with patient. Patient agreeable to appointment date and time.     Future Appointments   Date Time Provider Tahir Christiansen   3/9/2022  1:15 PM Julio Walsh MD  [facility]

## 2022-02-25 NOTE — TELEPHONE ENCOUNTER
Received call from patient requesting callback to discuss her ankle. Return call placed at this time. Per patient, she had recent uptake in work and is having a lot of swelling and aching that lasts for a few days. She states that she was called to work on the floor at [employer] on Monday, 3/21/2022, and after that shift her pain and swelling did not go down until Wednesday morning. She then had to work on the floor again yesterday and today and she states that this actually hurts worse than when she was originally injured. She does not report any new injury. She states that she has been icing and elevating on her breaks while at work as often as possible, as well as when she gets home. Routed to providers for recommendations.

## 2022-03-09 ENCOUNTER — HOSPITAL ENCOUNTER (OUTPATIENT)
Dept: GENERAL RADIOLOGY | Age: 67
Discharge: HOME | End: 2022-03-11
Payer: COMMERCIAL

## 2022-03-09 ENCOUNTER — OFFICE VISIT (OUTPATIENT)
Dept: ORTHOPEDIC SURGERY | Age: 67
End: 2022-03-09
Payer: COMMERCIAL

## 2022-03-09 DIAGNOSIS — S82.821D CLOSED TORUS FRACTURE OF DISTAL END OF RIGHT FIBULA WITH ROUTINE HEALING, SUBSEQUENT ENCOUNTER: Primary | ICD-10-CM

## 2022-03-09 DIAGNOSIS — S82.821D CLOSED TORUS FRACTURE OF DISTAL END OF RIGHT FIBULA WITH ROUTINE HEALING, SUBSEQUENT ENCOUNTER: ICD-10-CM

## 2022-03-09 PROCEDURE — 99212 OFFICE O/P EST SF 10 MIN: CPT | Performed by: PHYSICIAN ASSISTANT

## 2022-03-09 PROCEDURE — 73610 X-RAY EXAM OF ANKLE: CPT

## 2022-03-09 PROCEDURE — 99213 OFFICE O/P EST LOW 20 MIN: CPT | Performed by: PHYSICIAN ASSISTANT

## 2022-03-09 NOTE — PROGRESS NOTES
Chief Complaint   Patient presents with    Ankle Pain     Right ankle non-op fx 7/12/2021       SUBJECTIVE: Adele Coulter is a 53-year-old female who presents today for follow-up of her right ankle fracture treated nonoperatively. DOI: 7/12/2021. She is a work comp patient and has been back working at [employer]. She states that she was doing well until she noticed increased pain over the anterior lateral portion of her ankle particularly when she gets done working on the floor. She describes the pain as aching and throbbing in the ankle area. Denies numbness, tingling or paresthesias. No recent falls or injuries to the ankle. She wanted to come in and make sure everything was okay with her ankle. No other orthopedic complaints at this time. Review of Systems -   General ROS: negative for - chills, fatigue, fever or night sweats  Respiratory ROS: no cough, shortness of breath, or wheezing  Cardiovascular ROS: no chest pain or dyspnea on exertion  Gastrointestinal ROS: no abdominal pain, change in bowel habits, or black or bloody stools  Genitourinary: no hematuria, dysuria, or incontinence   Musculoskeletal ROS:see above  Neurological ROS: no TIA or stroke symptoms     OBJECTIVE:   Alert and oriented X 3, no acute distress, respirations easy and unlabored with no audible wheezes, skin warm and dry, speech and dress appropriate for noted age, affect euthymic. Extremity:  Right Lower Extremity  Skin clean dry and intact, without signs of infection  no edema noted  No tenderness over the medial or lateral portion of the ankle. Compartments supple throughout thigh and leg  Calf supple and nontender  Demonstrates active knee flexion/extension, ankle plantar/dorsiflexion/great toe extension. States sensation intact to touch in sural/deep peroneal/superficial peroneal/saphenous/posterior tibial nerve distributions to foot/ankle.    Palpable dorsalis pedis and posterior tibialis pulses, cap refill brisk in toes, foot warm/perfused. XR: 3/9/22   3 views right ankle and demonstrate a stable healed lateral malleolus fracture. No change in alignment. Ankle mortise is intact. No acute osseous abnormality noted. There were no vitals taken for this visit. ASSESSMENT:   Diagnosis Orders   1. Closed torus fracture of distal end of right fibula with routine healing, subsequent encounter       PLAN:  X-rays reviewed and discussed. Continue weightbearing as tolerated right lower extremity. Follow-up as needed. Call if any questions or concerns. [facility] Patient Plan Of Care  New Orders Needing C-9 Authorization: Right ankle [employer]  [employer]/Work Status: Continue current work status. Patient Progressing: Progressing as expected  Patient candidate for Vocational Rehab at this time: No  Patient determined to be MMI at this visit: Yes    Electronically signed by CHI Dean on 3/9/2022 at 1:56 PM  Note: This report was completed using [employer] voiced recognition software. Every effort has been made to ensure accuracy; however, inadvertent computerized transcription errors may be present.

## 2022-03-09 NOTE — PROGRESS NOTES
Sandi Nieves is a 78 y.o. female who presents for follow up of right ankle    SURGEON: Dr. Jacky Sears MD  Date of Injury: 7/12/2021  Date last seen in office: 10/13/2021    Symptoms: worse  New complaints: Patient began having increased pain a few weeks ago when she was at her job and had to work on the floor (patient employed at [employer]). She is having pain and burning on the anterolateral ankle. She is also having constant aching and throbbing. Weightbearing: right lower Weight bearing as tolerated      Assistive device No Device  Participating in therapy (location if yes)?  no    Refills Needed: None  Order/Referral Needed: N/A

## 2023-03-27 ENCOUNTER — HOSPITAL ENCOUNTER (EMERGENCY)
Age: 68
Discharge: ANOTHER HOSPITAL | End: 2023-03-28
Attending: STUDENT IN AN ORGANIZED HEALTH CARE EDUCATION/TRAINING PROGRAM
Payer: MEDICARE

## 2023-03-27 ENCOUNTER — APPOINTMENT (OUTPATIENT)
Dept: GENERAL RADIOLOGY | Age: 68
End: 2023-03-27
Payer: MEDICARE

## 2023-03-27 ENCOUNTER — APPOINTMENT (OUTPATIENT)
Dept: CT IMAGING | Age: 68
End: 2023-03-27
Payer: MEDICARE

## 2023-03-27 DIAGNOSIS — R51.9 ACUTE NONINTRACTABLE HEADACHE, UNSPECIFIED HEADACHE TYPE: Primary | ICD-10-CM

## 2023-03-27 DIAGNOSIS — U07.1 COVID-19: ICD-10-CM

## 2023-03-27 LAB
ALBUMIN SERPL-MCNC: 4.2 G/DL (ref 3.5–5.2)
ALP SERPL-CCNC: 86 U/L (ref 35–104)
ALT SERPL-CCNC: 28 U/L (ref 0–32)
ANION GAP SERPL CALCULATED.3IONS-SCNC: 12 MMOL/L (ref 7–16)
AST SERPL-CCNC: 27 U/L (ref 0–31)
BASOPHILS # BLD: 0.01 E9/L (ref 0–0.2)
BASOPHILS NFR BLD: 0.1 % (ref 0–2)
BILIRUB SERPL-MCNC: <0.2 MG/DL (ref 0–1.2)
BUN SERPL-MCNC: 12 MG/DL (ref 6–23)
CALCIUM SERPL-MCNC: 9.3 MG/DL (ref 8.6–10.2)
CHLORIDE SERPL-SCNC: 102 MMOL/L (ref 98–107)
CO2 SERPL-SCNC: 26 MMOL/L (ref 22–29)
CREAT SERPL-MCNC: 0.7 MG/DL (ref 0.5–1)
D DIMER: <200 NG/ML DDU
EOSINOPHIL # BLD: 0.07 E9/L (ref 0.05–0.5)
EOSINOPHIL NFR BLD: 0.7 % (ref 0–6)
ERYTHROCYTE [DISTWIDTH] IN BLOOD BY AUTOMATED COUNT: 13.5 FL (ref 11.5–15)
GLUCOSE SERPL-MCNC: 108 MG/DL (ref 74–99)
HCT VFR BLD AUTO: 41.5 % (ref 34–48)
HGB BLD-MCNC: 13.8 G/DL (ref 11.5–15.5)
IMM GRANULOCYTES # BLD: 0.04 E9/L
IMM GRANULOCYTES NFR BLD: 0.4 % (ref 0–5)
LYMPHOCYTES # BLD: 2 E9/L (ref 1.5–4)
LYMPHOCYTES NFR BLD: 21.2 % (ref 20–42)
MCH RBC QN AUTO: 31 PG (ref 26–35)
MCHC RBC AUTO-ENTMCNC: 33.3 % (ref 32–34.5)
MCV RBC AUTO: 93.3 FL (ref 80–99.9)
MONOCYTES # BLD: 0.8 E9/L (ref 0.1–0.95)
MONOCYTES NFR BLD: 8.5 % (ref 2–12)
NEUTROPHILS # BLD: 6.5 E9/L (ref 1.8–7.3)
NEUTS SEG NFR BLD: 69.1 % (ref 43–80)
PLATELET # BLD AUTO: 264 E9/L (ref 130–450)
PMV BLD AUTO: 10.6 FL (ref 7–12)
POTASSIUM SERPL-SCNC: 4.2 MMOL/L (ref 3.5–5)
PROT SERPL-MCNC: 7.2 G/DL (ref 6.4–8.3)
RBC # BLD AUTO: 4.45 E12/L (ref 3.5–5.5)
SODIUM SERPL-SCNC: 140 MMOL/L (ref 132–146)
TROPONIN, HIGH SENSITIVITY: 6 NG/L (ref 0–9)
WBC # BLD: 9.4 E9/L (ref 4.5–11.5)

## 2023-03-27 PROCEDURE — 36415 COLL VENOUS BLD VENIPUNCTURE: CPT

## 2023-03-27 PROCEDURE — 96374 THER/PROPH/DIAG INJ IV PUSH: CPT

## 2023-03-27 PROCEDURE — 70450 CT HEAD/BRAIN W/O DYE: CPT

## 2023-03-27 PROCEDURE — 71045 X-RAY EXAM CHEST 1 VIEW: CPT

## 2023-03-27 PROCEDURE — 99285 EMERGENCY DEPT VISIT HI MDM: CPT

## 2023-03-27 PROCEDURE — 96375 TX/PRO/DX INJ NEW DRUG ADDON: CPT

## 2023-03-27 PROCEDURE — 6360000002 HC RX W HCPCS: Performed by: STUDENT IN AN ORGANIZED HEALTH CARE EDUCATION/TRAINING PROGRAM

## 2023-03-27 PROCEDURE — 85025 COMPLETE CBC W/AUTO DIFF WBC: CPT

## 2023-03-27 PROCEDURE — 85378 FIBRIN DEGRADE SEMIQUANT: CPT

## 2023-03-27 PROCEDURE — 2580000003 HC RX 258: Performed by: STUDENT IN AN ORGANIZED HEALTH CARE EDUCATION/TRAINING PROGRAM

## 2023-03-27 PROCEDURE — 93005 ELECTROCARDIOGRAM TRACING: CPT | Performed by: STUDENT IN AN ORGANIZED HEALTH CARE EDUCATION/TRAINING PROGRAM

## 2023-03-27 PROCEDURE — 87635 SARS-COV-2 COVID-19 AMP PRB: CPT

## 2023-03-27 PROCEDURE — 84484 ASSAY OF TROPONIN QUANT: CPT

## 2023-03-27 PROCEDURE — 80053 COMPREHEN METABOLIC PANEL: CPT

## 2023-03-27 RX ORDER — METOCLOPRAMIDE HYDROCHLORIDE 5 MG/ML
10 INJECTION INTRAMUSCULAR; INTRAVENOUS ONCE
Status: COMPLETED | OUTPATIENT
Start: 2023-03-27 | End: 2023-03-27

## 2023-03-27 RX ORDER — DIPHENHYDRAMINE HYDROCHLORIDE 50 MG/ML
25 INJECTION INTRAMUSCULAR; INTRAVENOUS ONCE
Status: COMPLETED | OUTPATIENT
Start: 2023-03-27 | End: 2023-03-27

## 2023-03-27 RX ORDER — 0.9 % SODIUM CHLORIDE 0.9 %
1000 INTRAVENOUS SOLUTION INTRAVENOUS ONCE
Status: COMPLETED | OUTPATIENT
Start: 2023-03-27 | End: 2023-03-27

## 2023-03-27 RX ORDER — KETOROLAC TROMETHAMINE 30 MG/ML
15 INJECTION, SOLUTION INTRAMUSCULAR; INTRAVENOUS ONCE
Status: COMPLETED | OUTPATIENT
Start: 2023-03-27 | End: 2023-03-27

## 2023-03-27 RX ADMIN — METOCLOPRAMIDE 10 MG: 5 INJECTION, SOLUTION INTRAMUSCULAR; INTRAVENOUS at 22:43

## 2023-03-27 RX ADMIN — SODIUM CHLORIDE 1000 ML: 9 INJECTION, SOLUTION INTRAVENOUS at 22:47

## 2023-03-27 RX ADMIN — KETOROLAC TROMETHAMINE 15 MG: 30 INJECTION, SOLUTION INTRAMUSCULAR; INTRAVENOUS at 23:47

## 2023-03-27 RX ADMIN — DIPHENHYDRAMINE HYDROCHLORIDE 25 MG: 50 INJECTION, SOLUTION INTRAMUSCULAR; INTRAVENOUS at 22:43

## 2023-03-27 ASSESSMENT — LIFESTYLE VARIABLES
HOW MANY STANDARD DRINKS CONTAINING ALCOHOL DO YOU HAVE ON A TYPICAL DAY: PATIENT DOES NOT DRINK
HOW OFTEN DO YOU HAVE A DRINK CONTAINING ALCOHOL: NEVER

## 2023-03-27 ASSESSMENT — PAIN DESCRIPTION - LOCATION
LOCATION: HEAD
LOCATION: HEAD

## 2023-03-27 ASSESSMENT — PAIN DESCRIPTION - DESCRIPTORS: DESCRIPTORS: PRESSURE

## 2023-03-27 ASSESSMENT — PAIN DESCRIPTION - FREQUENCY: FREQUENCY: CONTINUOUS

## 2023-03-27 ASSESSMENT — PAIN SCALES - GENERAL
PAINLEVEL_OUTOF10: 10
PAINLEVEL_OUTOF10: 8

## 2023-03-27 ASSESSMENT — PAIN - FUNCTIONAL ASSESSMENT: PAIN_FUNCTIONAL_ASSESSMENT: 0-10

## 2023-03-27 NOTE — Clinical Note
Daniel Cleary was seen and treated in our emergency department on 3/27/2023. She may return to work on 04/01/2023. If you have any questions or concerns, please don't hesitate to call.       Amos Calhoun, DO

## 2023-03-28 ENCOUNTER — TELEPHONE (OUTPATIENT)
Dept: FAMILY MEDICINE CLINIC | Age: 68
End: 2023-03-28

## 2023-03-28 VITALS
HEIGHT: 64 IN | WEIGHT: 140 LBS | TEMPERATURE: 97.6 F | OXYGEN SATURATION: 97 % | RESPIRATION RATE: 18 BRPM | BODY MASS INDEX: 23.9 KG/M2 | DIASTOLIC BLOOD PRESSURE: 75 MMHG | SYSTOLIC BLOOD PRESSURE: 128 MMHG | HEART RATE: 71 BPM

## 2023-03-28 LAB
EKG ATRIAL RATE: 75 BPM
EKG P AXIS: 66 DEGREES
EKG P-R INTERVAL: 112 MS
EKG Q-T INTERVAL: 416 MS
EKG QRS DURATION: 92 MS
EKG QTC CALCULATION (BAZETT): 464 MS
EKG R AXIS: 52 DEGREES
EKG T AXIS: 98 DEGREES
EKG VENTRICULAR RATE: 75 BPM
SARS-COV-2 RDRP RESP QL NAA+PROBE: DETECTED

## 2023-03-28 PROCEDURE — 93010 ELECTROCARDIOGRAM REPORT: CPT | Performed by: INTERNAL MEDICINE

## 2023-03-28 ASSESSMENT — PAIN SCALES - GENERAL: PAINLEVEL_OUTOF10: 5

## 2023-03-28 ASSESSMENT — PAIN DESCRIPTION - LOCATION: LOCATION: HEAD

## 2023-03-28 NOTE — ED NOTES
Patient resting in bed. No emesis. Medicated/scans completed as documented. Pain 5/10, remains with cold compress on forehead. Daughter at bedside. Will cont. To observe.      Freeman Leon RN  03/28/23 0710

## 2023-03-28 NOTE — DISCHARGE INSTRUCTIONS
Follow up with Dr. Lorna Chiu, push fluids. Monitor pulse oximeter at home.   If symptoms worsen or concern arises return for re-evaluation

## 2023-03-28 NOTE — ED NOTES
Pulse ox while walking 97-98%. No desaturation noted. Pt. Denies SOB.      Irma Odell RN  57/75/08 [id number]       Irma Odell RN  03/28/23 0030

## 2023-03-28 NOTE — ED PROVIDER NOTES
movements intact. Conjunctiva/sclera: Conjunctivae normal.      Pupils: Pupils are equal, round, and reactive to light. Cardiovascular:      Rate and Rhythm: Normal rate and regular rhythm. Heart sounds: Normal heart sounds. No murmur heard. Pulmonary:      Effort: Pulmonary effort is normal. No respiratory distress. Breath sounds: Normal breath sounds. No stridor. No wheezing or rhonchi. Abdominal:      General: There is no distension. Palpations: Abdomen is soft. There is no mass. Tenderness: There is no abdominal tenderness. Musculoskeletal:      Cervical back: Normal range of motion and neck supple. Skin:     General: Skin is warm and dry. Coloration: Skin is not jaundiced or pale. Neurological:      General: No focal deficit present. Mental Status: She is alert and oriented to person, place, and time. Cranial Nerves: No cranial nerve deficit. Sensory: No sensory deficit. Coordination: Coordination normal.      Comments: No nuchal rigidity        Procedures    MDM       ED Course as of 03/29/23 1546   Mon Mar 27, 2023   2327 Patient re-evaluated. States that she is feeling improved down to 8 out of 10 from 12 out of 10. [BB]   2327 EKG: This EKG is signed and interpreted by me. Rate: 76  Rhythm: Sinus  Interpretation: non-specific EKG, no visible ST-elevations or Depressions; , QRS 92, QTc 454; T-wave inversions in I, aVL, V6  Comparison: no previous EKG available    [BB]   Tue Mar 28, 2023   0018 Patient re-evaluated. Laying in bed in no acute distress. Headache down to a 2 out of 10. Discussed today's results and reasons to return.  [BB]      ED Course User Index  [BB] Jesse Patricio DO        Differential diagnosis: migraine, less likely intracranial hemorrhage / mass to name a few  Review of ED/ Outpatient Records: none  Historians that case was discussed with: none  My EKG interpretation: see ED course  Imaging Non-plain film images

## 2023-03-29 ASSESSMENT — ENCOUNTER SYMPTOMS
WHEEZING: 0
VOMITING: 1
SORE THROAT: 0
SINUS PRESSURE: 0
ABDOMINAL DISTENTION: 0
BACK PAIN: 0
EYE REDNESS: 0
EYE PAIN: 0
SHORTNESS OF BREATH: 0
EYE DISCHARGE: 0
NAUSEA: 1
DIARRHEA: 0
COUGH: 0

## 2023-11-09 ENCOUNTER — TELEPHONE (OUTPATIENT)
Dept: CASE MANAGEMENT | Age: 68
End: 2023-11-09

## 2023-11-09 NOTE — TELEPHONE ENCOUNTER
I called the patient and she confirmed her CT lung screening at [facility] on 11/10/2023 at 3:00 pm.  I reminded the patient to arrive at 2:30 pm, enter through the main entrance, and register. Patient confirmed.           Electronically signed by Lizz Sanders on 11/9/23 at 11:42 AM EST

## 2023-11-10 ENCOUNTER — HOSPITAL ENCOUNTER (OUTPATIENT)
Dept: CT IMAGING | Age: 68
Discharge: HOME | End: 2023-11-10
Attending: INTERNAL MEDICINE
Payer: COMMERCIAL

## 2023-11-10 DIAGNOSIS — R68.3 CLUBBING OF FINGERS: ICD-10-CM

## 2023-11-10 DIAGNOSIS — F17.290 CIGAR SMOKER: ICD-10-CM

## 2023-11-10 DIAGNOSIS — Z87.891 PERSONAL HISTORY OF TOBACCO USE, PRESENTING HAZARDS TO HEALTH: ICD-10-CM

## 2023-11-10 DIAGNOSIS — F17.210 CIGARETTE SMOKER: ICD-10-CM

## 2023-11-10 PROCEDURE — 71271 CT THORAX LUNG CANCER SCR C-: CPT

## 2023-11-13 ENCOUNTER — TELEPHONE (OUTPATIENT)
Dept: CASE MANAGEMENT | Age: 68
End: 2023-11-13

## 2023-11-13 NOTE — TELEPHONE ENCOUNTER
No call, encounter opened to process CT Lung Screening. CT Lung Screen: 11/10/2023    IMPRESSION:  1. There is no pulmonary infiltrate or mass  2. 5 mm subpleural nodule seen within the left lower lobe on axial image  number 71 the borders are well-defined. This nodule was noted on the  patient's previous CT scan of the abdomen pelvis of 08/06/2021 and is  unchanged. Follow-up CT scan is recommended in 1 year. [facility] LUNG RADS:  Lung-RADS 2 - Benign ()     Management:  12 month screening LDCT     RECOMMENDATIONS:  If you would like to register your patient with the [place], please contact the Nurse Navigator at  [phone]. Pack years: 5    Social History     Tobacco Use  Smoking Status: Current  Every Day Smoker    Start Date: 25   Quit Date:    Types: Cigarettes   Packs/Day: 0.25   Years: 39   Pack Years: 9   Smokeless Tobacco: Never         Results letter sent to patient via my chart or mailed.      [address]

## 2024-08-23 ENCOUNTER — HOSPITAL ENCOUNTER (OUTPATIENT)
Age: 69
Discharge: HOME | End: 2024-08-25

## 2024-08-23 PROCEDURE — 88305 TISSUE EXAM BY PATHOLOGIST: CPT

## 2024-08-30 LAB — SURGICAL PATHOLOGY REPORT: NORMAL

## 2025-07-28 ENCOUNTER — TRANSCRIBE ORDERS (OUTPATIENT)
Dept: ADMINISTRATIVE | Age: 70
End: 2025-07-28

## 2025-07-28 DIAGNOSIS — Z87.891 PERSONAL HISTORY OF TOBACCO USE, PRESENTING HAZARDS TO HEALTH: Primary | ICD-10-CM

## 2025-07-28 DIAGNOSIS — F17.210 CIGARETTE SMOKER: ICD-10-CM
